# Patient Record
Sex: FEMALE | Race: OTHER | HISPANIC OR LATINO
[De-identification: names, ages, dates, MRNs, and addresses within clinical notes are randomized per-mention and may not be internally consistent; named-entity substitution may affect disease eponyms.]

---

## 2024-04-15 ENCOUNTER — NON-APPOINTMENT (OUTPATIENT)
Age: 49
End: 2024-04-15

## 2024-05-01 ENCOUNTER — APPOINTMENT (OUTPATIENT)
Dept: BARIATRICS | Facility: CLINIC | Age: 49
End: 2024-05-01
Payer: COMMERCIAL

## 2024-05-01 VITALS
DIASTOLIC BLOOD PRESSURE: 87 MMHG | HEIGHT: 61.5 IN | TEMPERATURE: 97.6 F | WEIGHT: 269.44 LBS | BODY MASS INDEX: 50.22 KG/M2 | HEART RATE: 80 BPM | OXYGEN SATURATION: 97 % | SYSTOLIC BLOOD PRESSURE: 138 MMHG

## 2024-05-01 DIAGNOSIS — Z87.891 PERSONAL HISTORY OF NICOTINE DEPENDENCE: ICD-10-CM

## 2024-05-01 DIAGNOSIS — Z82.49 FAMILY HISTORY OF ISCHEMIC HEART DISEASE AND OTHER DISEASES OF THE CIRCULATORY SYSTEM: ICD-10-CM

## 2024-05-01 DIAGNOSIS — Z83.3 FAMILY HISTORY OF DIABETES MELLITUS: ICD-10-CM

## 2024-05-01 DIAGNOSIS — Z80.1 FAMILY HISTORY OF MALIGNANT NEOPLASM OF TRACHEA, BRONCHUS AND LUNG: ICD-10-CM

## 2024-05-01 DIAGNOSIS — M17.0 BILATERAL PRIMARY OSTEOARTHRITIS OF KNEE: ICD-10-CM

## 2024-05-01 DIAGNOSIS — Z13.39 ENCOUNTER FOR SCREENING EXAM FOR OTHER MENTAL HEALTH AND BEHAVIORAL DISORDERS: ICD-10-CM

## 2024-05-01 DIAGNOSIS — E66.01 MORBID (SEVERE) OBESITY DUE TO EXCESS CALORIES: ICD-10-CM

## 2024-05-01 PROBLEM — Z00.00 ENCOUNTER FOR PREVENTIVE HEALTH EXAMINATION: Status: ACTIVE | Noted: 2024-05-01

## 2024-05-01 PROCEDURE — 99204 OFFICE O/P NEW MOD 45 MIN: CPT

## 2024-05-02 NOTE — HISTORY OF PRESENT ILLNESS
[de-identified] : Initial Bariatric Surgery Evaluation   Patient is a  48 year  y/o female with a BMI of 50.9 and the following obesity related comorbidities: osteoarthritis.  She has been struggling with weight for years and has failed multiple attempts at non-surgical weight loss options.  She has tried the following diets and weight loss options: Herbalife, ephedra, GLP agonist.  She  presents for bariatric surgery evaluation.   Current Weight: 269 Highest Weight in the last 5 years: 270  Lowest Weight in the last 5 years: 217  Gained 50+ pounds in the last 5 years She had tried suxenda and lost weight  Symptoms of GERD: Denies  Marital Status:  Children: 2 (29 and 12) Work: accounting Household: 4 ( struggling wtih weight)  PSH: Csx (x2)   Exercise: walking prior to knee problems Dietary Habits: eats the wrong things - doesn't overeat;  snacks on chips; coffee with creamer  Alcohol/Illicit Drugs/Tobacco: Social/occasional alcohol consumption  Following with orthopedic surgeon

## 2024-05-02 NOTE — PLAN
[FreeTextEntry1] : Will encourage proper eating habits and identifying stressors/triggers for poor eating habits and implementing appropriate behavioral changes   RD Evaluation - [place]   Preoperative Medical evaluations - Primary Care Physician, Pulmonologist, Behavioral Health   Preoperative Endoscopy   Investigations - Micronutrient Levels  F/U once comprehensive assessment complete and dietary counseling sufficient to plan for surgery   45 minutes total spent with patients discussing importance of weight loss and path to bariatric surgery.

## 2024-05-02 NOTE — ASSESSMENT
[FreeTextEntry1] : Patient is a 48 year y/o female  with a BMI of *** and *(comorbidity)* presents for bariatric surgery.  The different types of bariatric surgery were explained to her and the *(sleeve/bypass/revision)* was deemed the best option.  The patient wishes to enter into our medically supervised weight loss program and undergo appropriate preoperative evaluation for bariatric surgery.

## 2024-05-21 ENCOUNTER — APPOINTMENT (OUTPATIENT)
Dept: BARIATRICS | Facility: CLINIC | Age: 49
End: 2024-05-21

## 2024-05-21 VITALS
HEIGHT: 61.5 IN | WEIGHT: 270.38 LBS | HEART RATE: 73 BPM | TEMPERATURE: 98 F | BODY MASS INDEX: 50.39 KG/M2 | OXYGEN SATURATION: 99 % | DIASTOLIC BLOOD PRESSURE: 90 MMHG | SYSTOLIC BLOOD PRESSURE: 135 MMHG

## 2024-05-21 PROCEDURE — XXXXX: CPT | Mod: 1L

## 2024-06-18 ENCOUNTER — APPOINTMENT (OUTPATIENT)
Dept: BARIATRICS | Facility: CLINIC | Age: 49
End: 2024-06-18

## 2024-07-16 ENCOUNTER — APPOINTMENT (OUTPATIENT)
Dept: BARIATRICS | Facility: CLINIC | Age: 49
End: 2024-07-16

## 2024-07-17 ENCOUNTER — APPOINTMENT (OUTPATIENT)
Dept: BARIATRICS | Facility: CLINIC | Age: 49
End: 2024-07-17